# Patient Record
Sex: FEMALE | Race: WHITE
[De-identification: names, ages, dates, MRNs, and addresses within clinical notes are randomized per-mention and may not be internally consistent; named-entity substitution may affect disease eponyms.]

---

## 2022-09-01 ENCOUNTER — HOSPITAL ENCOUNTER (OUTPATIENT)
Dept: HOSPITAL 47 - RADMAMWWP | Age: 47
Discharge: HOME | End: 2022-09-01
Attending: FAMILY MEDICINE
Payer: COMMERCIAL

## 2022-09-01 DIAGNOSIS — R92.8: Primary | ICD-10-CM

## 2022-09-01 PROCEDURE — 77062 BREAST TOMOSYNTHESIS BI: CPT

## 2022-09-01 PROCEDURE — 77066 DX MAMMO INCL CAD BI: CPT

## 2022-09-15 NOTE — MM
Reason for Exam: Additional evaluation requested from prior study. 

Last screening mammogram was performed 10 month(s) ago.





Patient History: 

Menarche at age 12. First Full-Term Pregnancy at age 17.

Paternal grandmother had breast cancer. Mother had breast cancer. Sister had breast cancer under age

50. 





Risk Values: 

Helen 5 year model risk: 4.2%.

NCI Lifetime model risk: 37.3%.





Tissue Density: 

The breast tissue is heterogeneously dense. This may lower the sensitivity of mammography.





Findings: 

Analyzed By CAD. 

New distortion in the left breast anterior upper outer aspect at site of prior group of

microcalcifications. Suspect interval surgery though patient does not report this. Correlate

clinically. 





Overall Assessment: Probably benign, BI-RAD 3





Management: 

Screening Mammogram of both breasts in 1 year.

Confirm interval surgical excision left breast.  Otherwise no new suspicious mass or group of

microcalcifications bilaterally. Return to routine follow-up if surgery as expected is confirmed.



Electronically signed and approved by: Ryder Muller M.D.

## 2022-09-30 ENCOUNTER — HOSPITAL ENCOUNTER (OUTPATIENT)
Dept: HOSPITAL 47 - WWCWWP | Age: 47
Discharge: HOME | End: 2022-09-30
Attending: SURGERY
Payer: COMMERCIAL

## 2022-09-30 VITALS
DIASTOLIC BLOOD PRESSURE: 91 MMHG | SYSTOLIC BLOOD PRESSURE: 130 MMHG | TEMPERATURE: 98 F | RESPIRATION RATE: 17 BRPM | HEART RATE: 75 BPM

## 2022-09-30 DIAGNOSIS — Z53.9: Primary | ICD-10-CM

## 2022-09-30 NOTE — P.GSHP
History of Present Illness


H&P Date: 22


Chief Complaint: abnormal left breast mammogram





     Bernie is a 47 year old white female seen in consultation for DR. Kelley 

regarding a mammographic abnormality in the left breast.  She underwent a 

bilateral mammogram and 9122.  She was noted to have some architectural d

istortion in the left breast which was felt to be secondary to surgical 

resection of some microcalcifications at this site.  This was felt to be 

probably benign BIRADS 3 and screening mammogram of both breasts in 1 year was 

recommended, but of the left breast in 6 months. She was noted to have some 

microcalcification in the left breast and had a stero-biopsy done and was then 

referred to a surgeon and had a resection.  This was done  in 

[place], [place].  She does not know the exact pathology but was told it was 

not cancer. She was given the option of chemo-prevention and she declined.  He 

has had genetic testing done at [facility] in [place] and this was 

negative.





Her right breast is larger than the left, this seems to have been more 

noticeable recently.





CAffiene: 1 cup/day


nicotine: none


chocolate: rare


BCP: stopped in her 30's used for 7 years





Risk Evaluation:


5 Year Risk: 4.2%


Lifetime Risk: 37.3%





Family History:


paternal grandmother: bilateral breast cancer


sister: DCIS in 40's


mother: breast cancer in 40's


maternal grandfather: prostate cancer


maternal great grandmother: breast cancer








Hormonal History:


menarche: 11


, breast fed: no, age at first birth: 17


menopause: periods regular; LMP: 








Surgical history:


left breast lumpectomy


2 EGD and dilation


thyroid ablation


Endometrial ablation





Medical history:


hormonal issues/surgical polycystic ovarian disease


Was hyperthyroid underwent an ablation of her thyroid and is now hypothyroid








Social History:


nicotine: none


alcohol: rare


drugs: none

















- Constitutional


Constitutional: Denies chills, Denies fever





- EENT


Eyes: bilateral blurred vision, denies pain


Ears: deny: decreased hearing, tinnitus


Ears, nose, mouth and throat: Denies headache, Denies sore throat





- Breasts


Breasts: bilateral: as per HPI





- Cardiovascular


Cardiovascular: Denies chest pain, Denies shortness of breath





- Respiratory


Respiratory: Denies cough, Denies 7





- Gastrointestinal


Comment: 





esophageal dilation; GERD


Gastrointestinal: Denies abdominal pain, Denies diarrhea, Denies nausea, Denies 

vomiting





- Genitourinary (Female)


Genitourinary: Denies dysuria, Denies hematuria





- Menstruation


Menstruation: Reports period normal





- Musculoskeletal


Musculoskeletal: Denies myalgias





- Integumentary


Integumentary: Denies pruritus, Denies rash





- Neurological


Comment: 





right arm at times painful an ultrasound of posterior arm scheduled for next 

week


Neurological: Denies numbness, Denies weakness





- Psychiatric


Psychiatric: Denies anxiety, Denies depression





- Endocrine


Endocrine: Reports as per HPI





- Hematologic/Lymphatic


Comment: 





none





- Allergic/Immunologic


Allergic/Immunologic: Reports as per HPI





Past Medical History


Past Medical History: No Reported History


History of Any Multi-Drug Resistant Organisms: None Reported


Past Surgical History: Ablation


Additional Past Surgical History / Comment(s): ABLATION 12 YRS AGO, L BREAST 

SURGERY 


Past Anesthesia/Blood Transfusion Reactions: No Reported Reaction


Past Psychological History: No Psychological Hx Reported


Smoking Status: Never smoker


Past Alcohol Use History: Occasional


Past Drug Use History: None Reported





Medications and Allergies


                                Home Medications











 Medication  Instructions  Recorded  Confirmed  Type


 


Levothyroxine Sodium [Synthroid] 175 mcg PO DAILY 22 History








                                    Allergies











Allergy/AdvReac Type Severity Reaction Status Date / Time


 


No Known Allergies Allergy   Unverified 22 11:19














Surgical - Exam


                                   Vital Signs











Temp Pulse Resp BP Pulse Ox


 


 98.0 F   75   17   130/91   100 


 


 22 11:20  22 11:20  22 11:20  22 11:20  22 11:20














BMI: 37.3





- General


no distress





- Eyes


normal ocular movement





- ENT


no hearing loss





- Neck


trachea midline





- Respiratory


normal respiratory effort, clear to auscultation





- Cardiovascular


Rhythm: regular


Heart Sounds: normal: S1, S2





- Abdomen


Abdomen: soft, non tender, no guarding, no rigid, no rebound





- Integumentary





normal turgor; the patient has a rash over her chest including the breast and on

 her back.  She states this may be related to a change in medication or 

different water that she has used she was recently on vacation, she states she 

is prone to rashes.





- Neurologic


no disoriented, no combative





- Musculoskeletal


normal gait





- Psychiatric


oriented to time, oriented to person, oriented to place, speech is normal, 

memory intact





Breast Exam:


BRA; 2X


inspection: right breast larger than left breast , bilateral grade 3 ptosis


palpation:


right breast: Multiple positional exam fibrocystic changes no dominant masses or

 nodules of concern right breast is larger than left breast


Right axilla: No adenopathy of concern


Left breast: Multiple positional exam fibrocystic changes no dominant masses or 

nodules of concern


Left axilla: No adenopathy of concern





Results





 mammogram reviewedn personally





Assessment and Plan


Assessment: 


Impression:


hormonal issues/surgical polycystic ovarian disease


Was hyperthyroid underwent an ablation of her thyroid and is now hypothyroid


Asymmetry of the breast right larger than the left


Rash over torso including the breast


Radiographic changes in the left breast believed to be secondary to recent left 

breast biopsy


Bilateral mammogram 9122 BIRADS 3





Plan:


Left breast mammogram 6 months with physician exam at that time


Patient's lifetime risk of breast cancer is 37.3% based on Helen risk evaluation,

 she has been given the option of chemoprevention and declined,


Secondary to the high lifetime risk of breast cancer would recommend alternating

 MRI with mammogram every 6 months

## 2022-12-02 ENCOUNTER — HOSPITAL ENCOUNTER (OUTPATIENT)
Dept: HOSPITAL 47 - LABWHC1 | Age: 47
Discharge: HOME | End: 2022-12-02
Attending: FAMILY MEDICINE
Payer: COMMERCIAL

## 2022-12-02 DIAGNOSIS — L68.0: ICD-10-CM

## 2022-12-02 DIAGNOSIS — R51.9: ICD-10-CM

## 2022-12-02 DIAGNOSIS — E28.2: Primary | ICD-10-CM

## 2022-12-02 LAB
CRP SERPL HS-MCNC: 4.18 MG/L (ref 0–3)
MAGNESIUM SPEC-SCNC: 2.2 MG/DL (ref 1.5–2.4)

## 2022-12-02 PROCEDURE — 83735 ASSAY OF MAGNESIUM: CPT

## 2022-12-02 PROCEDURE — 86141 C-REACTIVE PROTEIN HS: CPT

## 2022-12-02 PROCEDURE — 83498 ASY HYDROXYPROGESTERONE 17-D: CPT

## 2022-12-02 PROCEDURE — 82306 VITAMIN D 25 HYDROXY: CPT

## 2022-12-02 PROCEDURE — 83090 ASSAY OF HOMOCYSTEINE: CPT

## 2022-12-02 PROCEDURE — 84630 ASSAY OF ZINC: CPT

## 2022-12-02 PROCEDURE — 36415 COLL VENOUS BLD VENIPUNCTURE: CPT

## 2022-12-02 PROCEDURE — 82397 CHEMILUMINESCENT ASSAY: CPT

## 2022-12-02 PROCEDURE — 83525 ASSAY OF INSULIN: CPT

## 2022-12-02 PROCEDURE — 82533 TOTAL CORTISOL: CPT

## 2022-12-02 PROCEDURE — 84146 ASSAY OF PROLACTIN: CPT

## 2022-12-26 ENCOUNTER — HOSPITAL ENCOUNTER (OUTPATIENT)
Dept: HOSPITAL 47 - RADCTMAIN | Age: 47
Discharge: HOME | End: 2022-12-26
Attending: FAMILY MEDICINE
Payer: COMMERCIAL

## 2022-12-26 DIAGNOSIS — R79.89: ICD-10-CM

## 2022-12-26 DIAGNOSIS — K57.30: ICD-10-CM

## 2022-12-26 DIAGNOSIS — N83.202: Primary | ICD-10-CM

## 2022-12-26 DIAGNOSIS — K44.9: ICD-10-CM

## 2022-12-26 LAB — BUN SERPL-SCNC: 8 MG/DL (ref 7–17)

## 2022-12-26 PROCEDURE — 36415 COLL VENOUS BLD VENIPUNCTURE: CPT

## 2022-12-26 PROCEDURE — 74170 CT ABD WO CNTRST FLWD CNTRST: CPT

## 2022-12-26 PROCEDURE — 84520 ASSAY OF UREA NITROGEN: CPT

## 2022-12-26 PROCEDURE — 82565 ASSAY OF CREATININE: CPT

## 2022-12-27 NOTE — CT
EXAMINATION TYPE: CT abdomen wo/w con, adrenal mass protocol

 

DATE OF EXAM: 12/26/2022

 

COMPARISON: NONE

 

HISTORY: 47-year-old female are 79.89, R10.9, elevated DHEA

 

TECHNIQUE: Contiguous axial scanning of the abdomen before and after administration of 70 ml Isovue 3
00 IV contrast.  Lobulated images were also obtained. Coronal/sagittal reconstructions performed.

 

CT DLP: 2067 mGycm

Automated exposure control for dose reduction was used.

 

 

FINDINGS: 

Heart normal size without pericardial lung bases clear without pleural effusion.

 

There is a moderate-sized hiatal hernia.

 

Liver borderline in size at 17.7 cm. No focal lesion is seen. Portal venous system is patent. No bili
gabriel ductal dilatation.

 

Gallbladder, adrenal glands, left kidney, spleen, and pancreas within normal limits. Specifically, no
 adrenal nodule or masses seen.

 

Circumaortic left renal vein. There is a tiny 9 mm cortical cyst lower pole left kidney.

 

No dilated small bowel, free air.

 

There is left midabdominal autumn mesentery with a few borderline sized lymph nodes measuring up to 9 
mm.

 

Mild overall stool burden. Left-sided colonic diverticulosis. No pericolonic inflammatory change.

 

Partially visualized cystic lesion of the left ovary measuring 3.4 cm, probably a dominant follicle o
r functional cyst.

 

Bones: Moderate degenerative disc disease L5.

 

 

 

IMPRESSION: 

 

1. SLIGHT AUTUMN MESENTERY IN THE LEFT MID ABDOMEN WITH BORDERLINE SIZED LYMPH NODES MEASURING UP TO 9
 MM. FINDINGS ARE NONSPECIFIC BUT MAY BE SEEN WITH MESENTERIC PANNICULITIS.  CORRELATE WITH PATIENT'S
 SYMPTOMS. RECOMMEND SIX-MONTH FOLLOW-UP TO ENSURE STABILITY/RESOLUTION AND EXCLUDE A NEOPLASTIC ETIO
LOGY.

 

2. NO ADRENAL GLAND NODULE OR MASS.

 

3. PARTIALLY VISUALIZED CYSTIC LESION OF THE LEFT OVARY MEASURES AT LEAST 3.4 CM. THIS IS PROBABLY A 
DOMINANT FOLLICLE OR FUNCTIONAL CYST. GIVEN PATIENT'S HISTORY, 6 - 8 WEEK FOLLOW-UP PELVIC ULTRASOUND
 CAN REASSESS.

 

4. MODERATE-SIZED HIATAL HERNIA. LEFT-SIDED COLONIC DIVERTICULOSIS WITHOUT ACUTE DIVERTICULITIS.

## 2023-01-02 ENCOUNTER — HOSPITAL ENCOUNTER (OUTPATIENT)
Dept: HOSPITAL 47 - RADMRIMAIN | Age: 48
Discharge: HOME | End: 2023-01-02
Attending: FAMILY MEDICINE
Payer: COMMERCIAL

## 2023-01-02 DIAGNOSIS — R90.82: ICD-10-CM

## 2023-01-02 DIAGNOSIS — G44.89: Primary | ICD-10-CM

## 2023-01-02 DIAGNOSIS — R79.89: ICD-10-CM

## 2023-01-02 DIAGNOSIS — G43.909: ICD-10-CM

## 2023-01-02 PROCEDURE — 70553 MRI BRAIN STEM W/O & W/DYE: CPT

## 2023-01-02 NOTE — MR
EXAMINATION TYPE: MR brain wo/w con

 

DATE OF EXAM: 1/2/2023 5:24 PM

 

CLINICAL INDICATION:Female, 47 years old with history of R79.89 CT; G44.89; headaches, blurred vision
, migraines.

 

COMPARISON: None

 

TECHNIQUE: Multi planar, multi sequence imaging was performed through the brain including: T1, T2, In
version recovery, susceptibility weighted imaging and gradient echo imaging and Diffusion weighted im
aging. The patient was then given intravenous contrast and multi planar, T1 fat-saturation images wer
e obtained.

IV Contrast: 11 cc Gadavist

 

FINDINGS: 

The gray-white junctions, ventricular system, basal cisterns appear unremarkable. Diffusion-weighted 
imaging shows no evidence of restricted diffusion to suggest acute/subacute infarct. Intracranial art
erial flow voids are maintained. Midline structures show no abnormality. Scattered foci of high T2 si
gnal intensity are seen within the periventricular white matter. The susceptibility weighted images d
o not reveal any evidence for micro-hemorrhage. After administration of gadolinium, no abnormal enhan
cement is seen.

 

The bone marrow signal is within normal limits. 

Paranasal sinuses and mastoid air cells: Mild scattered paranasal sinus disease.

Visualized orbits: Orbital contents are intact.

 

IMPRESSION:

1. No evidence of intracranial mass, acute/subacute infarct, or abnormal enhancement.

2. Nonspecific white matter changes, likely related to small vessel ischemic disease